# Patient Record
Sex: FEMALE | Race: WHITE
[De-identification: names, ages, dates, MRNs, and addresses within clinical notes are randomized per-mention and may not be internally consistent; named-entity substitution may affect disease eponyms.]

---

## 2017-04-16 ENCOUNTER — HOSPITAL ENCOUNTER (EMERGENCY)
Dept: HOSPITAL 74 - JERFT | Age: 22
Discharge: HOME | End: 2017-04-16
Payer: COMMERCIAL

## 2017-04-16 VITALS — HEART RATE: 64 BPM | DIASTOLIC BLOOD PRESSURE: 75 MMHG | TEMPERATURE: 98 F | SYSTOLIC BLOOD PRESSURE: 137 MMHG

## 2017-04-16 VITALS — BODY MASS INDEX: 28 KG/M2

## 2017-04-16 DIAGNOSIS — T76.21XA: Primary | ICD-10-CM

## 2017-04-16 LAB
ALBUMIN SERPL-MCNC: 3.8 G/DL (ref 3.4–5)
ALP SERPL-CCNC: 103 U/L (ref 45–117)
ALT SERPL-CCNC: 25 U/L (ref 12–78)
ANION GAP SERPL CALC-SCNC: 10 MMOL/L (ref 8–16)
AST SERPL-CCNC: 22 U/L (ref 15–37)
BASOPHILS # BLD: 0.7 % (ref 0–2)
BILIRUB SERPL-MCNC: 0.4 MG/DL (ref 0.2–1)
CALCIUM SERPL-MCNC: 9.2 MG/DL (ref 8.5–10.1)
CO2 SERPL-SCNC: 26 MMOL/L (ref 21–32)
COCKROFT - GAULT: 143.83
CREAT SERPL-MCNC: 0.7 MG/DL (ref 0.55–1.02)
DEPRECATED RDW RBC AUTO: 13.8 % (ref 11.6–15.6)
EOSINOPHIL # BLD: 2.2 % (ref 0–4.5)
GLUCOSE SERPL-MCNC: 101 MG/DL (ref 74–106)
HIV 1 & 2 AB: NEGATIVE
HIV 1 AGP24: NEGATIVE
MCH RBC QN AUTO: 29.8 PG (ref 25.7–33.7)
MCHC RBC AUTO-ENTMCNC: 33.7 G/DL (ref 32–36)
MCV RBC: 88.4 FL (ref 80–96)
NEUTROPHILS # BLD: 53.3 % (ref 42.8–82.8)
PLATELET # BLD AUTO: 236 K/MM3 (ref 134–434)
PMV BLD: 9.4 FL (ref 7.5–11.1)
PROT SERPL-MCNC: 7.5 G/DL (ref 6.4–8.2)
WBC # BLD AUTO: 9.3 K/MM3 (ref 4–10)
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
## 2018-01-26 ENCOUNTER — HOSPITAL ENCOUNTER (EMERGENCY)
Dept: HOSPITAL 74 - FER | Age: 23
Discharge: HOME | End: 2018-01-26
Payer: COMMERCIAL

## 2018-01-26 VITALS — BODY MASS INDEX: 27.1 KG/M2

## 2018-01-26 VITALS — SYSTOLIC BLOOD PRESSURE: 118 MMHG | DIASTOLIC BLOOD PRESSURE: 76 MMHG | HEART RATE: 76 BPM | TEMPERATURE: 98.2 F

## 2018-01-26 DIAGNOSIS — L02.411: Primary | ICD-10-CM

## 2018-01-26 PROCEDURE — 0H9CXZZ DRAINAGE OF LEFT UPPER ARM SKIN, EXTERNAL APPROACH: ICD-10-PCS

## 2018-01-26 NOTE — PDOC
History of Present Illness





- General


Chief Complaint: Abscess Boil


Stated Complaint: CYST


Time Seen by Provider: 01/26/18 00:44


History Source: Patient


Exam Limitations: No Limitations





- History of Present Illness


Initial Comments: 





01/26/18 02:00


This is a 22-year-old female who comes in complaining of a abscess in her right 

axilla. Patient has a history of similar [place] in the past that required I 

incision and drainage. Patient said that this evening in the abscess became 

very painful and started to drain spontaneously so she comes in for evaluation. 

Patient denies any fevers or chills. Patient said she is otherwise healthy





PAST MEDICAL HISTORY: As per history of present illness





PAST SURGICAL HISTORY:  no significant history





FAMILY HISTORY:  no pertinant history





SOCIAL HISTORY:  Pt lives with  family and is employed.





MEDICATIONS:  reviewed





ALLERGIES:  As per nursing notes





Review of Systems


General:  No fevers or chills, no weakness, no weight loss 


HEENT: No change in vision.  No sore throat,. No ear pain


CardioVascular:  No chest pain or shortness of breath


Respiratory:No cough, or wheezing. 


Gastrointestinal:  no nausea, vomitting, diarrhea or constipation,  No rectal 

bleeding


Genitourinary:  No dysuria, hematuria, or frequency


Musculoskeletal:  No joint or muscle pain or swelling,+


Neurologic: No headache, vertigo, dizziness or loss of consciousness


Psychiatric: nor depression 


Skin: Sebaceous cyst abscesses of right axilla


Endocrine: no increased thirst or abnormal weight change


Allergic: no skin or latex allergy


All other systems reviewed and normal








GENERAL: The patient is awake, alert, and fully oriented, in no acute distress.


HEAD: Normal with no signs of trauma.


EYES: Pupils equal, round and reactive to light, extraocular movements intact, 

sclera anicteric, conjunctiva clear.


EXTREMITIES: Right axilla there is a approximately 3 x 3 cm abscess with some 

purulent drainage. There is associated lymphadenopathy.


NEUROLOGICAL: Normal speech, normal gait. grossly intact 


PSYCH: Normal mood, normal affect.


SKIN: Warm, Dry, normal turgor, no rashes or lesions noted.





Procedure note: Incision and drainage of an abscess


Area anesthetized with 1% lidocaine no epinephrine


Abscess incised with a #11 blade


Moderate amount of. The material was removed, and loculation was broken up.


Abscess cavity packed with iodoform.


Patient tolerated the procedure well





Assessment and plan: This was a 22-year-old female who comes in with a 

sebaceous cyst abscess that required incision and drainage and packing. I 

performed the incision and drainage and packing and patient was discharged home 

she was told to remove the packing in 24 hours and start hot soaks patient does 

have somewhat she can follow-up with. 





Past History





- Past Medical History


Allergies/Adverse Reactions: 


 Allergies











Allergy/AdvReac Type Severity Reaction Status Date / Time


 


No Known Allergies Allergy   Verified 04/16/17 19:39











Home Medications: 


Ambulatory Orders





NK [No Known Home Medication]  04/16/17 








COPD: No





- Immunization History


Immunization Up to Date: Yes





- Suicide/Smoking/Psychosocial Hx


Smoking History: Never smoked


Have you smoked in the past 12 months: No


Number of Cigarettes Smoked Daily: 0


Hx Alcohol Use: No


Drug/Substance Use Hx: No


Substance Use Type: None





*Physical Exam





- Vital Signs


 Last Vital Signs











Temp Pulse Resp BP Pulse Ox


 


 98.2 F   76   16   118/76   100 


 


 01/26/18 00:46  01/26/18 00:46  01/26/18 00:46  01/26/18 00:46  01/26/18 00:46














*DC/Admit/Observation/Transfer


Diagnosis at time of Disposition: 


 Abscess of axilla








- Discharge Dispostion


Disposition: HOME


Condition at time of disposition: Stable


Admit: No





- Referrals


Referrals: 


ON STAFF,NOT [Primary Care Provider] - 





- Patient Instructions


Printed Discharge Instructions:  DI for Incision and Drainage of a Skin Abscess


Additional Instructions: 


Remove the packing tomorrow evening and start hot soaks as discussed by the 

doctor.





Tylenol or Motrin as needed for pain.





Return to the emergency department immediately with ANY new, persistent or 

worsening symptoms.





Continue any medications as previously prescribed by your physician.





You should follow up with your primary doctor as soon as possible regarding 

today's emergency department visit.


.


Please make sure your doctor reviews the results of your emergency evaluation.





Thank you for coming to the   Emergency Department today for your care. It was 

a pleasure to see you today. Please note that your evaluation is INCOMPLETE 

until you  follow-up with your doctor. 











- Post Discharge Activity

## 2018-11-08 ENCOUNTER — HOSPITAL ENCOUNTER (EMERGENCY)
Dept: HOSPITAL 74 - FER | Age: 23
LOS: 1 days | Discharge: HOME | End: 2018-11-09
Payer: COMMERCIAL

## 2018-11-08 VITALS — SYSTOLIC BLOOD PRESSURE: 121 MMHG | TEMPERATURE: 98.4 F | HEART RATE: 74 BPM | DIASTOLIC BLOOD PRESSURE: 67 MMHG

## 2018-11-08 VITALS — BODY MASS INDEX: 28.3 KG/M2

## 2018-11-08 DIAGNOSIS — L02.412: Primary | ICD-10-CM

## 2018-11-08 PROCEDURE — 0H9CXZZ DRAINAGE OF LEFT UPPER ARM SKIN, EXTERNAL APPROACH: ICD-10-PCS

## 2018-11-08 NOTE — PDOC
History of Present Illness





- General


History Source: Patient, Family, Old Records


Exam Limitations: No Limitations





- History of Present Illness


Initial Comments: 





18 23:51


The patient is a 22 year old female presenting with her family, with a 

significant past medical history of axillary cysts, who presents to the ED 

complaining of a left axillary cyst. She reports that she has a history of 

axilla cysts bilaterally with multiple I&Ds, occurring in 2014, 2016 and 

earlier in 2018. She states that she went to an urgent care first and got 

prescribed antibiotics. She opted to come to the ED due to the consistent pain 

and to see if we could drain the cyst. 





The patient denies chest pain, shortness of breath, headache and dizziness. 

Denies fever, chills, nausea, vomiting, diarrhea or constipation. 





Allergies: None 


Past surgical history: None reported 


Social History: No alcohol, tobacco or drug use reported 


\





<Ruben Martin - Last Filed: 18 23:52>





<Shayla Aragon - Last Filed: 18 04:20>





- General


Chief Complaint: Abscess Boil


Stated Complaint: CYST LT AXILLA


Time Seen by Provider: 18 22:29





Past History





<Ruben Martin - Last Filed: 18 23:52>





- Past Medical History


COPD: No





- Immunization History


Immunization Up to Date: Yes





- Suicide/Smoking/Psychosocial Hx


Smoking History: Never smoked


Have you smoked in the past 12 months: No


Number of Cigarettes Smoked Daily: 0


Hx Alcohol Use: No


Drug/Substance Use Hx: No


Substance Use Type: None





<Shayla Aragon - Last Filed: 18 04:20>





- Past Medical History


Allergies/Adverse Reactions: 


 Allergies











Allergy/AdvReac Type Severity Reaction Status Date / Time


 


No Known Allergies Allergy   Verified 17 19:39











Home Medications: 


Ambulatory Orders





Cephalexin [Keflex] 500 mg PO TID 18 











**Review of Systems





- Review of Systems


Able to Perform ROS?: Yes


Comments:: 





18 23:48


GENERAL/CONSTITUTIONAL: No fever or chills. No weakness.


HEAD, EYES, EARS, NOSE AND THROAT: No change in vision. No ear pain or 

discharge. No sore throat.


GASTROINTESTINAL: No nausea, vomiting, diarrhea or constipation.


GENITOURINARY: No dysuria, frequency, or change in urination.


CARDIOVASCULAR: No chest pain or shortness of breath.


RESPIRATORY: No cough, wheezing, or hemoptysis.


MUSCULOSKELETAL: No joint or muscle swelling or pain. No neck or back pain.


SKIN: (+) Left axilla mass. No rash


NEUROLOGIC: No headache, vertigo, loss of consciousness, or change in strength/

sensation.


ENDOCRINE: No increased thirst. No abnormal weight change.


HEMATOLOGIC/LYMPHATIC: No anemia, easy bleeding, or history of blood clots.


ALLERGIC/IMMUNOLOGIC: No hives or skin allergy.





<Ruben Martin - Last Filed: 18 23:52>





*Physical Exam





- Vital Signs


 Last Vital Signs











Temp Pulse Resp BP Pulse Ox


 


 98.4 F   74   16   121/67   100 


 


 18 22:30  18 22:30  18 22:30  18 22:30  18 22:30














- Physical Exam


Comments: 





18 23:48


Constitutional: Awake, alert, oriented.  No acute distress.


Head:  Normocephalic.  Atraumatic


Neck:  Supple.  Full ROM. No lymphadenopathy.


Cardiovascular:  Regular rate.  Regular rhythm. S1, S2 regular.  Distal pulses 

are 2+ and symmetric.  


Pulmonary/Chest:  No evidence of respiratory distress.  Clear to auscultation 

bilaterally  No wheezing, rales or rhonchi.


Abdominal:  Soft and non-distended.  There is no tenderness.  No rebound, 

guarding or rigidity.  No organomegaly. No palpable masses. Good bowel sounds.


Back:  No CVA tenderness.


Musculoskeletal:  No edema.  No cyanosis.  No clubbing.  Full range of motion 

in all extremities.  Nocalf tenderness. Radial/pedal pulses are intact and 2+ 

bilaterally


Skin: (+) 4 cm y 2 cm fluctuate, markedly tender, erythematoua, moderate 

edematous mass on left axilla. Healed horizontal linear incision at the middle 

of the mass with surrounding area of erythema. No streaking or abnormality 

noted.  


Neurological:  Alert and oriented to person, place, and time.  Cranial nerves II

-XII are grossly intact. Normal speech. Strength is grossly symmetric. No 

sensory deficits.


Psychiatric:  Good eye contact.  Normal interaction, affect and behavior.











<Ruben Martin - Last Filed: 18 23:52>





Procedures





- Incision and Drainage


I&D Site: Left: Axilla


Betadine cleansed: No (chlorhexidine/ethanol)


Anesthesia: 1% Lidocaine


Volume(ml): 2


Blade Size: 11


Attempts: 2


Iodinated Packin/4 in


Complications: none


Dressing: Yes (dry sterile)


Progress: 





Left axilla abscess prepped using chlorhexidine/ethanol and sterilely draped..  

2 mL of 1% lidocaine used for local anesthesia.  1.5 cm incision made with #11 

blade.  Serosanguineous material mixed with purulent and fatty sebaceous 

material expressed.  Sample sent for culture and sensitivity.  Wound irrigated 

with 40 mL of sterile normal saline and packed with quarter-inch iodinated 

packing material.  Dry sterile dressing applied to the wound.  Patient 

tolerated procedure well








<Shayla Aragon - Last Filed: 18 04:20>





Progress Note





- Progress Note


Progress Note: 





Documentation has been prepared under my direction and personally reviewed by 

me in its entirety. I attest that this documented accurately reflects all work, 

treatment, procedures and medical decision making performed by me.





<Shayla Aragon - Last Filed: 18 04:20>





Medical Decision Making





- Medical Decision Making





As noted above, this patient presented with infected sebaceous cyst in the left 

axilla.  This is a recurrent infection of sebaceous cyst with previous incision 

being in the midline of the new edematous/inflamed area.  Patient previously 

started on antibiotics by urgent care.  Exam and procedure as noted.





Patient will follow-up with general surgeon (Dr. Maagna) in approximately 10 

days.  This visit will be for evaluation for elective removal of the sebaceous 

cyst wall.  Meanwhile, the patient will continue antibiotics for a few days; if 

she has any recurrent pain/discharge/erythema, she should return to the ER








<Shayla Aragon - Last Filed: 18 04:20>





*DC/Admit/Observation/Transfer





- Attestations


Scribe Attestion: 





18 23:48





Documentation prepared by Ruben Martin, acting as medical scribe for Shayla Aragon MD





<Ruben Martin - Last Filed: 18 23:52>





<Shayla Aragon - Last Filed: 18 04:20>


Diagnosis at time of Disposition: 


 Sebaceous cyst of left axilla








- Discharge Dispostion


Disposition: HOME


Condition at time of disposition: Stable





- Referrals


Referrals: 


Juany Russell [Primary Care Provider] - 


Chadwick Magana MD [Staff Physician] - 18 2:20 pm





- Patient Instructions


Printed Discharge Instructions:  Epidermal Cyst


Additional Instructions: 


continue antibiotics for 2 more days


Motrin/Aleve/Tylenol as needed for pain


return to ER if pain/swelling worsens


followup with Dr Magana on  at 2:20PM





- Post Discharge Activity


Forms/Work/School Notes:  Back to Work